# Patient Record
Sex: MALE | Race: WHITE
[De-identification: names, ages, dates, MRNs, and addresses within clinical notes are randomized per-mention and may not be internally consistent; named-entity substitution may affect disease eponyms.]

---

## 2017-01-06 ENCOUNTER — HOSPITAL ENCOUNTER (OUTPATIENT)
Dept: HOSPITAL 10 - GIL | Age: 47
Discharge: HOME | End: 2017-01-06
Attending: INTERNAL MEDICINE
Payer: COMMERCIAL

## 2017-01-06 VITALS — DIASTOLIC BLOOD PRESSURE: 83 MMHG | SYSTOLIC BLOOD PRESSURE: 130 MMHG | RESPIRATION RATE: 15 BRPM | HEART RATE: 62 BPM

## 2017-01-06 VITALS
WEIGHT: 208.78 LBS | BODY MASS INDEX: 31.64 KG/M2 | BODY MASS INDEX: 31.64 KG/M2 | HEIGHT: 68 IN | HEIGHT: 68 IN | WEIGHT: 208.78 LBS

## 2017-01-06 VITALS — HEART RATE: 59 BPM | SYSTOLIC BLOOD PRESSURE: 143 MMHG | DIASTOLIC BLOOD PRESSURE: 89 MMHG | RESPIRATION RATE: 24 BRPM

## 2017-01-06 DIAGNOSIS — K29.60: Primary | ICD-10-CM

## 2017-01-06 PROCEDURE — 43239 EGD BIOPSY SINGLE/MULTIPLE: CPT

## 2017-01-06 PROCEDURE — 88312 SPECIAL STAINS GROUP 1: CPT

## 2017-01-06 PROCEDURE — 88305 TISSUE EXAM BY PATHOLOGIST: CPT

## 2017-01-07 NOTE — GILP
DATE OF PROCEDURE:  

 

 

NAME OF PROCEDURE:  Esophagogastroduodenoscopy.

 

PREOPERATIVE DIAGNOSIS:  Patient presents with a history of cirrhosis, a history of nonspecific abdo
jovita discomfort.  Rule out peptic ulcer disease, rule out esophageal varices.

 

POSTOPERATIVE DIAGNOSES:

1.  No esophageal varices noted.

2.  A prepyloric superficial ulcer was noted.

3.  Patchy gastritis was noted.

 

DESCRIPTION OF PROCEDURE:  After informed written consent was obtained, the patient was asked to lie
 on the left lateral side.  Intravenous anesthesia was given by the anesthesiologist.  When the albert
ent became somnolent the Olympus video upper endoscope was introduced into the oropharynx, then into
 the esophagus.  The esophagus appeared normal, with no significant varices. The upper esophagus aissatou
wed evidence of one small 3-4 cm long faint varix.  However the middle and lower esophagus did not s
how any varices.  The scope at this time was advanced into the stomach.  A superficial ulcer was loc
ated on a fold in the prepyloric area. Two small biopsies were obtained.  Patchy areas of mild eryth
ema were noted along the mucosal surface of the stomach.  Biopsy was done from the antrum and the le
sser curvature.  Mucosa of the duodenum up to the end of the third portion appeared normal.  The end
oscope at this time was withdrawn.  On the way out, no additional abnormalities were detected and th
e procedure was terminated.

 

PLAN:  Recommend Nexium 40-mg capsules, 1 a day in the morning before breakfast, for 2 months.

 

 

Dictated By: JOSE KEVIN/TONY

DD:    01/06/2017 10:16:39

DT:    01/06/2017 14:35:48

Conf#: [id number]

DID#:  [id number]

## 2017-03-06 ENCOUNTER — HOSPITAL ENCOUNTER (EMERGENCY)
Dept: HOSPITAL 10 - E/R | Age: 47
Discharge: HOME | End: 2017-03-06
Payer: COMMERCIAL

## 2017-03-06 VITALS
HEIGHT: 68 IN | WEIGHT: 210.32 LBS | BODY MASS INDEX: 31.88 KG/M2 | WEIGHT: 210.32 LBS | BODY MASS INDEX: 31.88 KG/M2 | HEIGHT: 68 IN

## 2017-03-06 VITALS
DIASTOLIC BLOOD PRESSURE: 95 MMHG | HEART RATE: 94 BPM | SYSTOLIC BLOOD PRESSURE: 151 MMHG | TEMPERATURE: 98.1 F | RESPIRATION RATE: 17 BRPM

## 2017-03-06 DIAGNOSIS — R40.2142: ICD-10-CM

## 2017-03-06 DIAGNOSIS — F10.239: Primary | ICD-10-CM

## 2017-03-06 DIAGNOSIS — K64.4: ICD-10-CM

## 2017-03-06 DIAGNOSIS — R40.2362: ICD-10-CM

## 2017-03-06 DIAGNOSIS — R40.2252: ICD-10-CM

## 2017-03-06 PROCEDURE — 99283 EMERGENCY DEPT VISIT LOW MDM: CPT

## 2017-03-06 NOTE — ERD
ER Documentation


Chief Complaint


Date/Time


DATE: 3/6/17 


TIME: 06:42


Chief Complaint


ALCOHOL WITHDRAWAL AND C/O HIS HEMORROIDS FLARING UP





HPI


46-year-old male presents to the emergency department with 2 separate issues.


Primarily, patient's main complaint is that he is complaining of hemorrhoid 

pain. He's had hemorrhoids for a few days with bleeding noted. He continues to 

have severe discomfort in that area that he rates as a 6/10. He reports ongoing 

bleeding. Patient reports no fevers chills or purulent drainage.


Patient's second issue is that he states he's been jerking heavily to try to 

kill the pain of his hemorrhoids. He states he's having mild shakes with his 

last drink approximately 2 days ago. He denies seizures. Patient has had 

alcohol withdrawal previously and this feels mild to him.





ROS


All systems reviewed and are negative except as per history of present illness.





Medications


Home Meds


Active Scripts


Witch Hazel* (Tucks* Pads) 40 Pad Pad, 1 PAD NE BID for PAIN, #40 PAD


   Prov:SAMINA CORBIN         3/6/17


Chlordiazepoxide* (Chlordiazepoxide*) 10 Mg Capsule, 10 MG PO TID for CONTROL 

WITHDRAWAL SYMPTOMS, #10 CAP


   Prov:SAMINA CORBIN         3/6/17


Mag Hydrox/Al Hydrox/Simeth (Maalox Advanced Suspension) 355 Ml Oral.susp, 2 

TSP PO TID, #24 OZ


   Prov:SARATH ALEJANDRE MD         12/10/16


Chlordiazepoxide* (Chlordiazepoxide*) 25 Mg Capsule, 25 MG PO Q8 Y for CONTROL 

WITHDRAWAL SYMPTOMS, #15 CAP


   Prov:SARATH ALEJANDRE MD         12/10/16


Reported Medications


Thiamine* (Vitamin B-1*) 100 Mg Tablet, 100 MG PO DAILY, TAB


   16


Gabapentin* (Gabapentin*) 300 Mg Capsule, 300 MG PO TID, #90 CAP


   16


Folic Acid* (Folic Acid*) 1 Mg Tablet, 1 MG PO DAILY, TAB


   16





Allergies


Allergies:  


Coded Allergies:  


     No Known Allergy (Unverified , 16)





PMhx/Soc


Anesthesia Reaction:  No


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


Hx Respiratory Disorders:  No


Hx Cardiac Disorders:  No


Hx Psychiatric Problems:  No


Hx Miscellaneous Medical Probl:  No


Hx Alcohol Use:  Yes (hx of ETOH- drinks daily- beer)


Hx Substance Use:  No


Hx Tobacco Use:  No





FmHx


Noncontributory for chief complaint





Physical Exam


Vitals





Vital Signs








  Date Time  Temp Pulse Resp B/P Pulse Ox O2 Delivery O2 Flow Rate FiO2


 


3/6/17 06:14 98.0 117 22 174/100 97   








Physical Exam


GENERAL:  The patient is well developed and appropriate for usual state of 

health in no apparent distress


HEENT: Pupils equal, round, and reactive to light.  EOMI. There is no scleral 

icterus. 


NECK:  C-spine is soft and supple, there is no meningismus.  There is no 

cervical lymphadenopathy.


LUNGS:  Clear to auscultation bilaterally. There are no rales, wheezes or 

rhonchi.


HEART:  Regular rate and rhythm, no murmurs, clicks, rubs or gallops.


ABDOMEN: Soft, non-tender, non-distended.  There are bowel sounds in all four 

quadrants. No rebound or guarding.


EXTREMITIES:  There is no peripheral cyanosis or edema.  No focal swelling or 

erythema.


NEURO:  The patient moves all four extremities with 5/5 strength.  Cranial 

nerves II - XII are intact. Normal gait. Alert and oriented. There is minimal 

tremor noted with no asterixis


SKIN:  There is no apparent rash or petechiae. Patient has an external 

hemorrhoid noted with no evidence of abscess. There is no active bleeding.


HEME/LYMPHATIC:  There is no evidence of excessive bruising or lymphedema.


PSYCHIATRIC:  The patient does not appear anxious or depressed.





Procedures/MDM


Patient was taken to a room, seen and evaluated. Comfort measures were 

initiated. 





MEDICAL DECISION MAKIN-year-old male sent with 2 separate issues. From the 

standpoint of his hemorrhoids, I will be treating supportively and referred to 

a surgeon for outpatient hemorrhoidectomy. From the standpoint of his alcohol 

issues, he is in mild withdrawal with no signs of delirium tremens and will be 

treated with outpatient benzodiazepines. Overall, patient appears to be 

clinically well and appropriate for outpatient care.





Departure


Diagnosis:  


 Primary Impression:  


 Alcohol withdrawal syndrome


 Additional Impression:  


 Acute hemorrhoid


Condition:  Stable


Patient Instructions:  Treating Hemorrhoids: Self-Care, Alcohol Withdrawal


Referrals:  


[facility] (PCP)








DIONICIO LAKHANI MD





Additional Instructions:  


Do not drink alcohol.





Return for any problems or concerns











SAMINA CORBIN Mar 6, 2017 06:56

## 2017-03-08 ENCOUNTER — HOSPITAL ENCOUNTER (EMERGENCY)
Dept: HOSPITAL 10 - FTE | Age: 47
Discharge: HOME | End: 2017-03-08
Payer: COMMERCIAL

## 2017-03-08 VITALS
HEIGHT: 68 IN | HEIGHT: 68 IN | BODY MASS INDEX: 33.25 KG/M2 | WEIGHT: 219.36 LBS | BODY MASS INDEX: 33.25 KG/M2 | WEIGHT: 219.36 LBS

## 2017-03-08 DIAGNOSIS — F10.230: Primary | ICD-10-CM

## 2017-03-08 PROCEDURE — 99283 EMERGENCY DEPT VISIT LOW MDM: CPT

## 2017-03-08 NOTE — ERD
ER Documentation


Chief Complaint


Date/Time


DATE: 3/8/17 


TIME: 04:55


Chief Complaint


ETOH withdrawal. Drank Alcohol 1600





HPI


46-year-old male presents to emergency department for complaints of insomnia 

and shaking. Patient was trying to stop drinking alcohol, stating that he has 

withdrawal symptoms. Patient was seen here in the emergency department 2 days 

ago, was given Librium, states that the doses were low and it has not been 

helping him, he ran out of the medicines, wants more medications. At this time, 

patient does not have any hallucination, did not have any seizures. Patient 

does not have any shortness breath or chest pain. Patient does not have any 

other symptoms. Patient's last drink was yesterday afternoon to control his 

symptoms.





ROS


All systems reviewed and are negative except as per history of present illness.





Medications


Home Meds


Active Scripts


Chlordiazepoxide* (Chlordiazepoxide*) 25 Mg Capsule, 25 MG PO Q8 Y for CONTROL 

WITHDRAWAL SYMPTOMS, #3 CAP


   Prov:NELY WALTON NP         3/8/17


Witch Hazel* (Tucks* Pads) 40 Pad Pad, 1 PAD CO BID for PAIN, #40 PAD


   Prov:SAMINA CORBIN         3/6/17


Chlordiazepoxide* (Chlordiazepoxide*) 10 Mg Capsule, 10 MG PO TID for CONTROL 

WITHDRAWAL SYMPTOMS, #10 CAP


   Prov:SAMINA CORBIN         3/6/17


Mag Hydrox/Al Hydrox/Simeth (Maalox Advanced Suspension) 355 Ml Oral.susp, 2 

TSP PO TID, #24 OZ


   Prov:SARATH ALEJANDRE MD         12/10/16


Chlordiazepoxide* (Chlordiazepoxide*) 25 Mg Capsule, 25 MG PO Q8 Y for CONTROL 

WITHDRAWAL SYMPTOMS, #15 CAP


   Prov:SARATH ALEJANDRE MD         12/10/16


Reported Medications


Thiamine* (Vitamin B-1*) 100 Mg Tablet, 100 MG PO DAILY, TAB


   6/16/16


Gabapentin* (Gabapentin*) 300 Mg Capsule, 300 MG PO TID, #90 CAP


   6/16/16


Folic Acid* (Folic Acid*) 1 Mg Tablet, 1 MG PO DAILY, TAB


   6/16/16





Allergies


Allergies:  


Coded Allergies:  


     No Known Allergy (Unverified , 9/14/16)





PMhx/Soc


Anesthesia Reaction:  No


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


Hx Respiratory Disorders:  No


Hx Cardiac Disorders:  No


Hx Psychiatric Problems:  No


Hx Miscellaneous Medical Probl:  No


Hx Alcohol Use:  Yes (hx of ETOH- drinks daily- beer)


Hx Substance Use:  No


Hx Tobacco Use:  No


Smoking Status:  Never smoker





FmHx


Family History:  No coronary disease, No diabetes, No other





Physical Exam


Vitals





Vital Signs








  Date Time  Temp Pulse Resp B/P Pulse Ox O2 Delivery O2 Flow Rate FiO2


 


3/8/17 04:07 98.5 98 22 168/89 97   








Physical Exam


GENERAL:  The patient is well developed and appropriate for usual state of 

health, in no apparent distress.


CHEST:  Clear to auscultation bilaterally. There are no rales, wheezes or 

rhonchi. 


HEART:  Regular rate and rhythm. No murmurs, clicks, rubs or gallops. No S3 or 

S4.


ABDOMEN:  Soft, nontender and nondistended. Good bowel sounds. No rebound or 

guarding. No gross peritonitis. No gross organomegaly or masses. No Cifuentes sign 

or McBurney point tenderness.


BACK:  No midline or flank tenderness.


EXTREMITIES:  Equal pulses bilaterally. There is no peripheral clubbing, 

cyanosis or edema. No focal swelling or erythema. Full range of motion. Grossly 

neurovascularly intact.


NEURO:  Alert and oriented. Cranial nerves 2-12 intact. Motor strength in all 4 

extremities with 5/5 strength.  Sensation grossly intact. Normal speech and 

gait. 


SKIN:  There is no apparent rash or petechia. The skin is warm and dry.


HEMATOLOGIC AND LYMPHATIC:  There is no evidence of excessive bruising or 

lymphedema. No gross cervical, axillary, or inguinal lymphadenopathy.





Procedures/MDM


Medical decision making: Patient symptoms at this time consistent with alcohol 

withdrawal, at this time, no symptoms of delirium tremens, and symptoms of any 

seizures, symptoms of neurologic emergencies at this time. Patient was given 3 

pills of Librium, was advised to seek alcohol detoxification or alcohol 

withdrawal management, patient was given resources were to go, at this time, 

patient is stable patient does not have any symptoms of hemodynamic 

instability. Patient is ambulatory, walks steady gait, coherent, verbalized 

understanding with instructions, should return to ER precautions for delirium 

tremens symptoms.





Departure


Diagnosis:  


 Primary Impression:  


 Alcohol withdrawal


 Complication of substance-induced condition:  uncomplicated  Qualified Code:  

F10.230 - Alcohol withdrawal, uncomplicated


Condition:  Stable


Patient Instructions:  Alcohol Withdrawal











NELY WALTON NP Mar 8, 2017 04:58

## 2017-05-30 ENCOUNTER — HOSPITAL ENCOUNTER (EMERGENCY)
Dept: HOSPITAL 10 - E/R | Age: 47
Discharge: HOME | End: 2017-05-30
Payer: COMMERCIAL

## 2017-05-30 VITALS
WEIGHT: 176.37 LBS | HEIGHT: 64 IN | BODY MASS INDEX: 30.11 KG/M2 | HEIGHT: 64 IN | BODY MASS INDEX: 30.11 KG/M2 | WEIGHT: 176.37 LBS

## 2017-05-30 VITALS — DIASTOLIC BLOOD PRESSURE: 85 MMHG | SYSTOLIC BLOOD PRESSURE: 141 MMHG | RESPIRATION RATE: 18 BRPM | HEART RATE: 78 BPM

## 2017-05-30 DIAGNOSIS — F10.230: Primary | ICD-10-CM

## 2017-05-30 PROCEDURE — 96372 THER/PROPH/DIAG INJ SC/IM: CPT

## 2017-05-30 NOTE — ERD
ER Documentation


Chief Complaint


Date/Time


DATE: 17 


TIME: 07:47


Chief Complaint


etoh withdrawal, last drink yesterday,has shakes





HPI


46-year-old male here saying that he is withdrawing from alcohol.  He says he 

feels shaky.  His last drink was yesterday.  He denies auditory or visual 

hallucinations.  Denies suicidal homicidal ideation.





ROS


All systems reviewed and are negative except as per history of present illness.





Medications


Home Meds


Active Scripts


Chlordiazepoxide* (Chlordiazepoxide*) 25 Mg Capsule, 25 MG PO Q8 Y for CONTROL 

WITHDRAWAL SYMPTOMS, #3 CAP


   Prov:NELY WALTON NP         3/8/17


Witch Hazel* (Tucks* Pads) 40 Pad Pad, 1 PAD TN BID for PAIN, #40 PAD


   Prov:SAMINA CORBIN         3/6/17


Chlordiazepoxide* (Chlordiazepoxide*) 10 Mg Capsule, 10 MG PO TID for CONTROL 

WITHDRAWAL SYMPTOMS, #10 CAP


   Prov:SAMINA CORBIN         3/6/17


Mag Hydrox/Al Hydrox/Simeth (Maalox Advanced Suspension) 355 Ml Oral.susp, 2 

TSP PO TID, #24 OZ


   Prov:SARATH ALEJANDRE MD         12/10/16


Chlordiazepoxide* (Chlordiazepoxide*) 25 Mg Capsule, 25 MG PO Q8 Y for CONTROL 

WITHDRAWAL SYMPTOMS, #15 CAP


   Prov:SARATH ALEJANDRE MD         12/10/16


Reported Medications


Thiamine* (Vitamin B-1*) 100 Mg Tablet, 100 MG PO DAILY, TAB


   16


Gabapentin* (Gabapentin*) 300 Mg Capsule, 300 MG PO TID, #90 CAP


   16


Folic Acid* (Folic Acid*) 1 Mg Tablet, 1 MG PO DAILY, TAB


   16





Allergies


Allergies:  


Coded Allergies:  


     No Known Allergy (Unverified , 16)





PMhx/Soc


History of Surgery:  Yes (colon resection, appendectomy, cholecystectomy)


Anesthesia Reaction:  No


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


Hx Respiratory Disorders:  No


Hx Cardiac Disorders:  No


Hx Psychiatric Problems:  No


Hx Miscellaneous Medical Probl:  Yes (alchoholic)


Hx Alcohol Use:  Yes (hx of ETOH- drinks daily- beer)


Hx Substance Use:  No


Hx Tobacco Use:  No


Smoking Status:  Never smoker





Physical Exam


Vitals





Vital Signs








  Date Time  Temp Pulse Resp B/P Pulse Ox O2 Delivery O2 Flow Rate FiO2


 


17 06:28 98.1 110 20 157/91 96   








Physical Exam


Const:  []


Head:   Atraumatic 


Eyes:    Normal Conjunctiva


ENT:    Normal External Ears, Nose and Mouth.


Neck:               Full range of motion..~ No meningismus.


Resp:    Clear to auscultation bilaterally


Cardio:    Regular rate and rhythm, no murmurs


Abd:    Soft, non tender, non distended. Normal bowel sounds


Skin:    No petechiae or rashes


Back:    No midline or flank tenderness


Ext:    No cyanosis, or edema


Neur:    Awake and alert


Psych:    Normal Mood and Affect


Results 24 hrs





 Current Medications








 Medications


  (Trade)  Dose


 Ordered  Sig/Palmer


 Route


 PRN Reason  Start Time


 Stop Time Status Last Admin


Dose Admin


 


 Lorazepam


  (Ativan)  2 mg  ONCE  ONCE


 IM


   17 07:00


 17 07:01 DC 17 06:42


 











Procedures/MDM


Medical decision-makin-year-old male here with alcohol withdrawal 

symptoms.  Treated with Ativan with good response.  Patient will be discharged 

home with [facility].  Given referrals for outpatient treatment centers.





Departure


Diagnosis:  


 Primary Impression:  


 Alcohol withdrawal syndrome


 Complication of substance-induced condition:  uncomplicated  Qualified Code:  

F10.230 - Alcohol withdrawal syndrome, uncomplicated


Condition:  Stable











TIFFANY MELENDEZ May 30, 2017 07:48

## 2018-07-28 ENCOUNTER — HOSPITAL ENCOUNTER (EMERGENCY)
Dept: HOSPITAL 91 - E/R | Age: 48
Discharge: HOME | End: 2018-07-28
Payer: COMMERCIAL

## 2018-07-28 ENCOUNTER — HOSPITAL ENCOUNTER (EMERGENCY)
Age: 48
Discharge: HOME | End: 2018-07-28

## 2018-07-28 DIAGNOSIS — R40.2362: ICD-10-CM

## 2018-07-28 DIAGNOSIS — R40.2142: ICD-10-CM

## 2018-07-28 DIAGNOSIS — F41.1: Primary | ICD-10-CM

## 2018-07-28 DIAGNOSIS — R40.2252: ICD-10-CM

## 2018-07-28 DIAGNOSIS — F10.10: ICD-10-CM

## 2018-07-28 PROCEDURE — 99283 EMERGENCY DEPT VISIT LOW MDM: CPT

## 2019-04-16 ENCOUNTER — HOSPITAL ENCOUNTER (EMERGENCY)
Dept: HOSPITAL 10 - FTE | Age: 49
Discharge: HOME | End: 2019-04-16
Payer: SELF-PAY

## 2019-04-16 ENCOUNTER — HOSPITAL ENCOUNTER (EMERGENCY)
Dept: HOSPITAL 91 - FTE | Age: 49
Discharge: HOME | End: 2019-04-16
Payer: SELF-PAY

## 2019-04-16 VITALS
BODY MASS INDEX: 30.17 KG/M2 | BODY MASS INDEX: 30.17 KG/M2 | WEIGHT: 210.76 LBS | WEIGHT: 210.76 LBS | HEIGHT: 70 IN | HEIGHT: 70 IN

## 2019-04-16 VITALS — SYSTOLIC BLOOD PRESSURE: 138 MMHG | DIASTOLIC BLOOD PRESSURE: 90 MMHG | RESPIRATION RATE: 16 BRPM | HEART RATE: 87 BPM

## 2019-04-16 DIAGNOSIS — R04.0: Primary | ICD-10-CM

## 2019-04-16 PROCEDURE — 99282 EMERGENCY DEPT VISIT SF MDM: CPT

## 2019-04-16 NOTE — ERD
ER Documentation


Chief Complaint


Chief Complaint





intermittent nose bleeding x last night





HPI


48-year-old male with past medical history of EtOH abuse,?  Liver disease 


presents with epistaxis.  Patient says he had a persistent dry cough and had 


first episode of nosebleed last night which lasted approximately 10 minutes.  


Had another episode around 2:20 PM which lasted about 5 minutes.  Prior to these


episodes patient states his been having a persistent dry cough after having 


flulike symptoms several days prior which have since resolved.  States he has 


some type of liver disease and is followed by specialist but is unable to 


specify the type of liver disease.  But he does have a history of heavy drinking


but states he did no longer drinks alcohol.  Denies any recent EtOH or drug 


abuse.  Is not on any hepatotoxic medications.  He otherwise is without 


complaint.





ROS


All systems reviewed and are negative except as per history of present illness.





Medications


Home Meds


Active Scripts


Guaifenesin* (Robitussin*) 100 Mg/5 Ml Syrup, 200 MG PO Q6H PRN for COUGH for 7 


Days, ML


   Prov:LOIDA HARRIS PA-C         4/16/19


Lorazepam* (Lorazepam*) 1 Mg Tablet, 1 MG PO Q8H PRN for ANXIETY, #10 TAB


   Prov:NABILA GIBSON MD         7/28/18


Chlordiazepoxide* (Chlordiazepoxide*) 25 Mg Capsule, 25 MG PO Q8, #60 CAP


   Prov:TIFFANY MELENDEZ         5/30/17


Chlordiazepoxide* (Chlordiazepoxide*) 25 Mg Capsule, 25 MG PO Q8 PRN for CONTROL


WITHDRAWAL SYMPTOMS, #3 CAP


   Prov:NELY WALTON NP         3/8/17


Witch Hazel* (Tucks* Pads) 40 Pad Pad, 1 PAD [place] BID for PAIN, #40 PAD


   Prov:SAMINA CORBIN         3/6/17


Chlordiazepoxide* (Chlordiazepoxide*) 10 Mg Capsule, 10 MG PO TID for CONTROL 


WITHDRAWAL SYMPTOMS, #10 CAP


   Prov:SAMINA CORBIN         3/6/17


Mag Hydrox/Al Hydrox/Simeth (Maalox Advanced Suspension) 355 Ml Oral.susp, 2 TSP


PO TID, #24 OZ


   Prov:SARATH ALEJANDRE MD         12/10/16


Chlordiazepoxide* (Chlordiazepoxide*) 25 Mg Capsule, 25 MG PO Q8 PRN for CONTROL


WITHDRAWAL SYMPTOMS, #15 CAP


   Prov:SARATH ALEJANDRE MD         12/10/16


Reported Medications


Thiamine* (Vitamin B-1*) 100 Mg Tablet, 100 MG PO DAILY, TAB


   6/16/16


Gabapentin* (Gabapentin*) 300 Mg Capsule, 300 MG PO TID, #90 CAP


   6/16/16


Folic Acid* (Folic Acid*) 1 Mg Tablet, 1 MG PO DAILY, TAB


   6/16/16





Allergies


Allergies:  


Coded Allergies:  


     No Known Allergy (Unverified , 9/14/16)





PMhx/Soc


History of Surgery:  Yes (colon resection, appendectomy, cholecystectomy)


Anesthesia Reaction:  No


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


Hx Respiratory Disorders:  No


Hx Cardiac Disorders:  No


Hx Psychiatric Problems:  No


Hx Miscellaneous Medical Probl:  Yes (alcohol abuse)


Hx Alcohol Use:  Yes (hx of ETOH- drinks daily (last drink last night at 2200))


Hx Substance Use:  No


Hx Tobacco Use:  No


Smoking Status:  Never smoker





Physical Exam


Vitals





Vital Signs


  Date      Temp  Pulse  Resp  B/P (MAP)   Pulse Ox  O2          O2 Flow    FiO2


Time                                                 Delivery    Rate


   4/16/19  99.0     86    19      153/90        96


     15:07                          (111)





Physical Exam


Const:   No acute distress


Head:   Atraumatic 


Eyes:    Normal Conjunctiva


ENT:    Normal External Ears, R nostril with some dried residual blood in 


anterior chamber, L nostril unremarkable 


Neck:               Full range of motion. No meningismus.


Resp:   Clear to auscultation bilaterally


Cardio:   Regular rate and rhythm, no murmurs


Abd:    Soft, non tender, non distended. Normal bowel sounds


Skin:   No petechiae or rashes


Back:   No midline or flank tenderness


Ext:    No cyanosis, or edema


Neur:   Awake and alert


Psych:    Normal Mood and Affect





Procedures/MDM


48-year-old male who presents with epistaxis.  Patient not actively bleeding 


from nose at time of examination and hemodynamically stable.  Doubt any etiology


of nosebleed warranting further emergent evaluation and treatment.  Nosebleed is


unilateral and localized to the right nostril is likely anterior given 


persistent coughing.  Exam without concerning findings.  Not on any blood 


thinner medications.


Patient reports history of unspecified liver disease (former EtOH abuse) but 


doubt this is contributing to his bleeding.  I have advised the patient to fo


llow-up with his PMD and GI specialist.  Have advised him regarding strict 


return precautions if he has protracted bleeding or other concerning symptoms.





Plan: Cough suppressant, PMD and GI follow-up for reported liver disease, strict


return precautions





DISPOSITION PLAN:


We discussed follow up with the patient's primary care doctor within 24 to 48 


hours. Patient counseled regarding my diagnostic impression and care plan. Prior


to discharge all questions answered. Pt agrees with treatment plan and understa


nds strict return precautions. Precautionary instructions provided including 


instructions to return to the ER if not improving or for any worsening or 


changing symptoms or concerns.





Departure


Condition:  Stable


Patient Instructions:  Epistaxis (Adult)


Referrals:  


[facility]





Additional Instructions:  


Follow-up with your MD in 2-3 days.  If systems persist with protracted bleeding


that does not stop with recommended interventions return to the emergency room 


for further evaluation.


You should follow-up with your GI specialist provider for continued care of 


reported liver disease.











LOIDA HARRIS PA-C             Apr 16, 2019 15:57